# Patient Record
(demographics unavailable — no encounter records)

---

## 2024-11-10 NOTE — PHYSICAL EXAM
[Frail] : frail [Normal Venous Pressure] : normal venous pressure [Normal S1, S2] : normal S1, S2 [Murmur] : murmur [Clear Lung Fields] : clear lung fields [Soft] : abdomen soft [Normal] : no rash, no skin lesions [No Focal Deficits] : no focal deficits [Alert and Oriented] : alert and oriented [de-identified] : hard of hearing

## 2024-11-10 NOTE — DISCUSSION/SUMMARY
[FreeTextEntry1] : Assessment:  1. Medtronic Dual chamber pacemaker.  Unipolar RA/RV leads. V paced 78% 2. Mechanical aortic valve 3. paroxysmal atrial flutter 4. HTN 5. [id number] score is 4.  Maintained on Coumadin.   Mrs. Brown presents to establish care of her device.  Her device functioning normally.  She feels well.  No device adjustments were made.  Her AF burden is 41.8%.  She reports no significant symptoms secondary to arrhythmia and is maintained on Coumadin and Metoprolol XL 25mg QD.  She is currently monitored remotely / [place].  She has signed consent for transfer of her remote monitoring to [facility].   Follow up in 6mo.

## 2024-11-10 NOTE — PHYSICAL EXAM
[Frail] : frail [Normal Venous Pressure] : normal venous pressure [Normal S1, S2] : normal S1, S2 [Murmur] : murmur [Clear Lung Fields] : clear lung fields [Soft] : abdomen soft [Normal] : no rash, no skin lesions [No Focal Deficits] : no focal deficits [Alert and Oriented] : alert and oriented [de-identified] : hard of hearing

## 2024-11-10 NOTE — DISCUSSION/SUMMARY
[FreeTextEntry1] : Assessment:  1. Medtronic Dual chamber pacemaker.  Unipolar RA/RV leads. V paced 78% 2. Mechanical aortic valve 3. paroxysmal atrial flutter 4. HTN 5. [id number] score is 4.  Maintained on Coumadin.   Mrs. Brown presents to establish care of her device.  Her device functioning normally.  She feels well.  No device adjustments were made.  Her AF burden is 41.8%.  She reports no significant symptoms secondary to arrhythmia and is maintained on Coumadin and Metoprolol XL 25mg QD.  She is currently monitored remotely / [place].  She has signed consent for transfer of her remote monitoring to [facility].   Follow up in 6mo.

## 2024-11-10 NOTE — HISTORY OF PRESENT ILLNESS
[FreeTextEntry1] : Mrs. Brown is a 95yo F with a PMHx of HTN, hyperlipidemia, paroxysmal Aflutter, mechanical aortic valve, s/p PPM medtronic dual chamber PPM.  She follows with Dr. Houser and presents today to establish care of her device.  She is accompanied by her daughter.  She is feeling well without any device related complaints.  She is sedentary and requires assistance to stand.   PMH: large scalp mass.  no plan for removal YUNG CKD  Echo 1/16/24: LVEF 45-50%. DARIN 1.5 cm. Moderate RVD and reduced systolic function. Moderate MR. Mechanical AVR. Peak/mean gradient 20/7 mmHg.  DVI 0.56. Moderate TR. PASP 33 mmHg.  Device Interrogation 11/6/24 Medtronic dual chamber PPM.  Unipolar RA and RV leads.  Battery estimating 10.7 years.  DDDR .  MVP off.  AV conduction during device check today.  frequent atrial ectopy. AF burden 41.8% All pacing, sensing and impedance levels within normal limits

## 2024-11-10 NOTE — ADDENDUM
[FreeTextEntry1] :  I, Elizabeth Arango, am scribing for and the presence of Dr. Wright the following sections: HPI, PMH,Family/social history, ROS, Physical Exam, Assessment / Plan.I, Jaylen Wright, personally performed the services described in the documentation, reviewed the documentation recorded by the scribe in my presence and it accurately and completely records my words and actions.

## 2024-11-10 NOTE — HISTORY OF PRESENT ILLNESS
[FreeTextEntry1] : Mrs. Brown is a 93yo F with a PMHx of HTN, hyperlipidemia, paroxysmal Aflutter, mechanical aortic valve, s/p PPM medtronic dual chamber PPM.  She follows with Dr. Houser and presents today to establish care of her device.  She is accompanied by her daughter.  She is feeling well without any device related complaints.  She is sedentary and requires assistance to stand.   PMH: large scalp mass.  no plan for removal YUNG CKD  Echo 1/16/24: LVEF 45-50%. DARIN 1.5 cm. Moderate RVD and reduced systolic function. Moderate MR. Mechanical AVR. Peak/mean gradient 20/7 mmHg.  DVI 0.56. Moderate TR. PASP 33 mmHg.  Device Interrogation 11/6/24 Medtronic dual chamber PPM.  Unipolar RA and RV leads.  Battery estimating 10.7 years.  DDDR .  MVP off.  AV conduction during device check today.  frequent atrial ectopy. AF burden 41.8% All pacing, sensing and impedance levels within normal limits

## 2024-12-30 NOTE — PHYSICAL EXAM
[No Acute Distress] : no acute distress [Well Nourished] : well nourished [Well Developed] : well developed [Well-Appearing] : well-appearing [Normal Sclera/Conjunctiva] : normal sclera/conjunctiva [Normal Outer Ear/Nose] : the outer ears and nose were normal in appearance [No Respiratory Distress] : no respiratory distress  [No Accessory Muscle Use] : no accessory muscle use [No Rash] : no rash [No Focal Deficits] : no focal deficits [Normal Affect] : the affect was normal [Normal Insight/Judgement] : insight and judgment were intact [de-identified] : Well-appearing elderly female

## 2024-12-30 NOTE — PLAN
[FreeTextEntry1] : Family requesting antibiotics for possibility of bacterial infection Explained that most likely this is a viral process and they will attempt to have patient's cardiologist order an RVP when they come to draw her weekly PT/INR because she takes Coumadin Rx Z-Eric-family understands to start antibiotics if patient seems to be worsening or has fever or increase in mucus or productive sputum Instructions and precautions reviewed

## 2024-12-30 NOTE — REVIEW OF SYSTEMS
[Fever] : no fever [Chills] : no chills [Fatigue] : fatigue [Vision Problems] : no vision problems [Postnasal Drip] : postnasal drip [Chest Pain] : no chest pain [Shortness Of Breath] : no shortness of breath [Cough] : cough [Abdominal Pain] : no abdominal pain [Vomiting] : no vomiting [Dysuria] : no dysuria [Skin Rash] : no skin rash [Headache] : no headache [FreeTextEntry4] : Congestion

## 2024-12-30 NOTE — HISTORY OF PRESENT ILLNESS
[Home] : at home, [unfilled] , at the time of the visit. [Other Location: e.g. Home (Enter Location, City,State)___] : at [unfilled] [FreeTextEntry3] : Patient's daughter, Halina [FreeTextEntry8] :  94-year-old female with history of atrial fibrillation, congestive heart failure, status post PPM, CKD, anemia and hypertension with daughter (Halina) complaining of URI symptoms which started last evening with increased cough and congestion with no reported fever.  Patient's daughter states pulse ox's have been between 91 to 93%.  Patient's daughter's  recently had influenza and patient did receive her influenza vaccine.  Patient tolerating p.o. and soups without difficulty no reported chest pain, shortness of breath, abdominal pain, nausea, vomiting or urinary symptoms

## 2025-03-27 NOTE — PHYSICAL EXAM
[No Supraclavicular Adenopathy] : no supraclavicular adenopathy [No Cervical Adenopathy] : no cervical adenopathy [de-identified] : The central portion of the posterior scalp is a 6 cm fungating mass that extends at 2 and half centimeters with a necrotic surface with old hematoma but no purulence or erythema.  The lesion is freely mobile and not fixed to the skull.

## 2025-03-27 NOTE — PAST MEDICAL HISTORY
[Menarche Age ____] : age at menarche was [unfilled] [Total Preg ___] : G[unfilled] [Age At Live Birth ___] : Age at live birth: [unfilled] [History of Hormone Replacement Treatment] : has no history of hormone replacement treatment

## 2025-03-27 NOTE — PHYSICAL EXAM
[No Supraclavicular Adenopathy] : no supraclavicular adenopathy [No Cervical Adenopathy] : no cervical adenopathy [de-identified] : The central portion of the posterior scalp is a 6 cm fungating mass that extends at 2 and half centimeters with a necrotic surface with old hematoma but no purulence or erythema.  The lesion is freely mobile and not fixed to the skull.

## 2025-03-27 NOTE — HISTORY OF PRESENT ILLNESS
[FreeTextEntry1] : 94-year-old postmenopausal woman has a multiple year history of a skin lesion on her scalp that recently started to become necrotic and bleed especially since she is on Coumadin.  She went to Dr. Zaid barrera from general surgery who did a biopsy and this showed well-differentiated keratinizing squamous cell carcinoma.  She comes in for recommendations from me.  Patient's family tell us that she has itching this area and disrupting it causing bleeding but it is never gotten infected.  Patient has no other signs of any other abnormalities.  Family wants to be relatively conservative with this lesion.

## 2025-03-28 NOTE — HISTORY OF PRESENT ILLNESS
[FreeTextEntry1] : Patient is a 94-year-old elderly  female who has a history of a skin lesion growing in her scalp over the last few years.  The family notes that over the last 1 year the lesion started increasing in size more rapidly and became necrotic and has bled repeatedly since she was started on Coumadin.  She was seen in general surgery did biopsy.  The pathology was consistent with a well-differentiated squamous cell carcinoma of the skin.  Patient was referred to Dr. Du in surgical oncology.  Given her age and general condition, she was recommended consideration for radiation therapy.

## 2025-03-28 NOTE — PHYSICAL EXAM
[Thin] : thin [Normal] : oriented to person, place and time, the affect was normal, the mood was normal and not anxious [de-identified] : A 5 x 5 cm exophytic lesion noted arising from scalp near the vertex with a central ulceration.  The lesion is not fixed to the underlying bone.  Has a reasonably well-defined edges. [de-identified] : No preauricular nodes.  No postauricular nodes.  No other neck nodes palpable

## 2025-03-28 NOTE — VITALS
[Maximal Pain Intensity: 0/10] : 0/10 [NoTreatment Scheduled] : no treatment scheduled [60: Requires occasional assistance, but is able to care for most of his/her needs] : 60: Requires occasional assistance, but is able to care for most of his/her needs

## 2025-03-28 NOTE — PHYSICAL EXAM
[Thin] : thin [Normal] : oriented to person, place and time, the affect was normal, the mood was normal and not anxious [de-identified] : A 5 x 5 cm exophytic lesion noted arising from scalp near the vertex with a central ulceration.  The lesion is not fixed to the underlying bone.  Has a reasonably well-defined edges. [de-identified] : No preauricular nodes.  No postauricular nodes.  No other neck nodes palpable

## 2025-04-16 NOTE — PHYSICAL EXAM
[General Appearance - Well Developed] : well developed [Sclera] : the sclera and conjunctiva were normal [] : no respiratory distress [Exaggerated Use Of Accessory Muscles For Inspiration] : no accessory muscle use [Nondistended] : nondistended [Oriented To Time, Place, And Person] : oriented to person, place, and time [de-identified] : tumor posterior scalp; no erythema or desquamation w/in RT field [de-identified] : patient in wheelchair

## 2025-04-16 NOTE — DISEASE MANAGEMENT
[Clinical] : TNM Stage: c [II] : II [TTNM] : 2 [NTNM] : 0 [MTNM] : 0 [de-identified] : [id number] [[place] [id number]] : 8148 [de-identified] : scalp

## 2025-04-16 NOTE — DISEASE MANAGEMENT
[Clinical] : TNM Stage: c [II] : II [TTNM] : 2 [MTNM] : 0 [NTNM] : 0 [de-identified] : 1113 [[place] [id number]] : 6167 [de-identified] : scalp

## 2025-04-16 NOTE — PHYSICAL EXAM
[General Appearance - Well Developed] : well developed [Sclera] : the sclera and conjunctiva were normal [] : no respiratory distress [Exaggerated Use Of Accessory Muscles For Inspiration] : no accessory muscle use [Nondistended] : nondistended [Oriented To Time, Place, And Person] : oriented to person, place, and time [de-identified] : tumor posterior scalp; no erythema or desquamation w/in RT field [de-identified] : patient in wheelchair

## 2025-04-16 NOTE — REVIEW OF SYSTEMS
[Fatigue: Grade 1 - Fatigue relieved by rest] : Fatigue: Grade 1 - Fatigue relieved by rest [Pruritus: Grade 0] : Pruritus: Grade 0 [Dermatitis Radiation: Grade 0] : Dermatitis Radiation: Grade 0

## 2025-04-16 NOTE — HISTORY OF PRESENT ILLNESS
[FreeTextEntry1] : 4/16/2025 Ms. Brown presents today for her OTV.  She completed 6/15 fractions to the scalp for a total of 1800 cGy to the scalp.  skin care with Aquaphor was reinforced with her family.  No bleeding or pain was reported at the treatment site.

## 2025-04-22 NOTE — REVIEW OF SYSTEMS
[Fatigue: Grade 1 - Fatigue relieved by rest] : Fatigue: Grade 1 - Fatigue relieved by rest [Pruritus: Grade 0] : Pruritus: Grade 0 [Skin Induration: Grade 1 - Mild induration, able to move skin parallel to plane (sliding) and perpendicular to skin (pinching up)] : Skin Induration: Grade 1 - Mild induration, able to move skin parallel to plane (sliding) and perpendicular to skin (pinching up)

## 2025-04-22 NOTE — HISTORY OF PRESENT ILLNESS
[FreeTextEntry1] : 4/16/2025 Ms. Brown presents today for her OTV.  She completed 6/15 fractions to the scalp for a total of 1800 cGy to the scalp.  skin care with Aquaphor was reinforced with her family.  No bleeding or pain was reported at the treatment site.    4/22/25 FX 10 Today she reports no pain at the treatment site.  She is using Aquaphor twice per day as directed.  Her appetite has been good and she has been eating well.

## 2025-04-22 NOTE — DISEASE MANAGEMENT
[Clinical] : TNM Stage: c [TTNM] : 2 [NTNM] : 0 [MTNM] : 0 [II] : II [de-identified] : [id number] [[place] [address]] : 7645 [de-identified] : scalp

## 2025-04-29 NOTE — DISEASE MANAGEMENT
[Clinical] : TNM Stage: c [TTNM] : 2 [NTNM] : 0 [MTNM] : 0 [II] : II [de-identified] : 0045 [[place] [address]] : 8439 [de-identified] : scalp

## 2025-04-29 NOTE — HISTORY OF PRESENT ILLNESS
[FreeTextEntry1] : 4/16/2025 Ms. Brown presents today for her OTV.  She completed 6/15 fractions to the scalp for a total of 1800 cGy to the scalp.  skin care with Aquaphor was reinforced with her family.  No bleeding or pain was reported at the treatment site.    4/22/25 FX 10 Today she reports no pain at the treatment site.  She is using Aquaphor twice per day as directed.  Her appetite has been good and she has been eating well.    4/29/2025 Ms. Brown presents today for her OTV.  She completed 15/15 fractions to the scalp for a total of 4500 cGy.

## 2025-05-27 NOTE — DISEASE MANAGEMENT
[Clinical] : TNM Stage: c [II] : II [TTNM] : 2 [NTNM] : 0 [MTNM] : 0 [de-identified] : [id number] [[place] [id number]] : 0050 [de-identified] : scalp, 15 fractions completed on 04/29/25

## 2025-05-27 NOTE — DISEASE MANAGEMENT
[Clinical] : TNM Stage: c [II] : II [TTNM] : 2 [NTNM] : 0 [MTNM] : 0 [de-identified] : 1924 [[place] [address]] : 6885 [de-identified] : scalp, 15 fractions completed on 04/29/25

## 2025-05-27 NOTE — REASON FOR VISIT
[Post-Treatment Evaluation] : post-treatment evaluation for [Other: ___] : [unfilled] [Family Member] : family member [Formal Caregiver] : formal caregiver

## 2025-05-27 NOTE — REVIEW OF SYSTEMS
[Negative] : Allergic/Immunologic [Pruritus: Grade 0] : Pruritus: Grade 0 [Skin Hyperpigmentation: Grade 1 - Hyperpigmentation covering <10% BSA; no psychosocial impact] : Skin Hyperpigmentation: Grade 1 - Hyperpigmentation covering <10% BSA; no psychosocial impact [Dermatitis Radiation: Grade 0] : Dermatitis Radiation: Grade 0 [de-identified] : lesion to scalp

## 2025-05-27 NOTE — REVIEW OF SYSTEMS
[Negative] : Allergic/Immunologic [Pruritus: Grade 0] : Pruritus: Grade 0 [Skin Hyperpigmentation: Grade 1 - Hyperpigmentation covering <10% BSA; no psychosocial impact] : Skin Hyperpigmentation: Grade 1 - Hyperpigmentation covering <10% BSA; no psychosocial impact [Dermatitis Radiation: Grade 0] : Dermatitis Radiation: Grade 0 [de-identified] : lesion to scalp

## 2025-05-27 NOTE — PHYSICAL EXAM
[] : no respiratory distress [Exaggerated Use Of Accessory Muscles For Inspiration] : no accessory muscle use [Heart Rate And Rhythm] : heart rate and rhythm were normal [Arterial Pulses Normal] : the arterial pulses were normal [Abdomen Soft] : soft [Nondistended] : nondistended [Normal] : the sclera and conjunctiva were normal, pupils were equal in size, round, reactive to light and extraocular movements were intact. [de-identified] : lesion to the scalp is healing well, granulation tissue noted, no drainage or odor [de-identified] : see abov

## 2025-05-27 NOTE — PHYSICAL EXAM
[] : no respiratory distress [Exaggerated Use Of Accessory Muscles For Inspiration] : no accessory muscle use [Heart Rate And Rhythm] : heart rate and rhythm were normal [Arterial Pulses Normal] : the arterial pulses were normal [Abdomen Soft] : soft [Nondistended] : nondistended [Normal] : the sclera and conjunctiva were normal, pupils were equal in size, round, reactive to light and extraocular movements were intact. [de-identified] : lesion to the scalp is healing well, granulation tissue noted, no drainage or odor [de-identified] : see abov

## 2025-05-27 NOTE — HISTORY OF PRESENT ILLNESS
[FreeTextEntry1] : Patient is a 95-year-old elderly  female who has a history of a skin lesion growing in her scalp over the last few years.  The family notes that over the last 1 year the lesion started increasing in size more rapidly and became necrotic and has bled repeatedly since she was started on Coumadin.  She was seen in general surgery did biopsy.  The pathology was consistent with a well-differentiated squamous cell carcinoma of the skin.  Patient was referred to Dr. Du in surgical oncology.  Given her age and general condition, she was recommended consideration for radiation therapy.  05/27/25 Ms. Brown presents today for PTE s/p 15 fractions of radiation therapy to the scalp completed on 04/29/25. Lesion to the scalp is healing, no drainage or odor noted. Patient's caregiver and family have been applying Aquaphor to the site BID, she also is wearing a hat outside. [facility] were [facility]

## 2025-07-02 NOTE — HISTORY OF PRESENT ILLNESS
[FreeTextEntry1] : Patient is a 95-year-old elderly  female who has a history of a skin lesion growing in her scalp over the last few years. The family notes that over the last 1 year the lesion started increasing in size more rapidly and became necrotic and has bled repeatedly since she was started on Coumadin. She was seen in general surgery did biopsy. The pathology was consistent with a well-differentiated squamous cell carcinoma of the skin. Patient was referred to Dr. Du in surgical oncology. Given her age and general condition, she was recommended consideration for radiation therapy.  05/27/25 Ms. Brown presents today for PTE s/p 15 fractions of radiation therapy to the scalp completed on 04/29/25. Lesion to the scalp is healing, no drainage or odor noted. Patient's caregiver and family have been applying Aquaphor to the site BID, she also is wearing a hat outside. [facility] were [facility].  7/15/2025 Ms. Brown presents today for a follow up. She is s/p 15 fractions of radiation therapy to the scalp completed on 04/29/25.

## 2025-07-02 NOTE — DISEASE MANAGEMENT
[Clinical] : TNM Stage: c [TTNM] : 2 [NTNM] : 0 [MTNM] : 0 [II] : II [de-identified] : 8459 [[place] [address]] : 3007 [de-identified] : scalp, 15 fractions completed on 04/29/25

## 2025-07-02 NOTE — REASON FOR VISIT
[Routine Follow-Up] : routine follow-up visit for [Other: ___] : [unfilled] [Family Member] : family member [Formal Caregiver] : formal caregiver

## 2025-07-15 NOTE — HISTORY OF PRESENT ILLNESS
[FreeTextEntry1] : Patient is a 95-year-old elderly  female who has a history of a skin lesion growing in her scalp over the last few years. The family notes that over the last 1 year the lesion started increasing in size more rapidly and became necrotic and has bled repeatedly since she was started on Coumadin. She was seen in general surgery did biopsy. The pathology was consistent with a well-differentiated squamous cell carcinoma of the skin. Patient was referred to Dr. Du in surgical oncology. Given her age and general condition, she was recommended consideration for radiation therapy.  05/27/25 Ms. Brown presents today for PTE s/p 15 fractions of radiation therapy to the scalp completed on 04/29/25. Lesion to the scalp is healing, no drainage or odor noted. Patient's caregiver and family have been applying Aquaphor to the site BID, she also is wearing a hat outside. They were [facility].  7/15/2025 Ms. Brown presents today for a follow up. She is s/p 15 fractions of radiation therapy to the scalp completed on 04/29/25. Her wound scalp is healing well, no drainage or odor noted. She has been applying Aquaphor regularly and is wearing a hat when outdoors.

## 2025-07-15 NOTE — DISEASE MANAGEMENT
[TTNM] : 2 [NTNM] : 0 [MTNM] : 0 [de-identified] : 0123 [[place] [address]] : 9479 [de-identified] : scalp, 15 fractions completed on 04/29/25

## 2025-07-15 NOTE — PHYSICAL EXAM
[] : no respiratory distress [Exaggerated Use Of Accessory Muscles For Inspiration] : no accessory muscle use 2 [Heart Rate And Rhythm] : heart rate and rhythm were normal [Arterial Pulses Normal] : the arterial pulses were normal [Abdomen Soft] : soft [Nondistended] : nondistended [Normal] : oriented to person, place and time, the affect was normal, the mood was normal and not anxious [de-identified] : lesion to the scalp is healing well, granulation tissue noted, no drainage or odor [de-identified] : see above, hair is also growing slowly

## 2025-07-15 NOTE — PHYSICAL EXAM
[] : no respiratory distress [Exaggerated Use Of Accessory Muscles For Inspiration] : no accessory muscle use [Heart Rate And Rhythm] : heart rate and rhythm were normal [Arterial Pulses Normal] : the arterial pulses were normal [Abdomen Soft] : soft [Nondistended] : nondistended [Normal] : oriented to person, place and time, the affect was normal, the mood was normal and not anxious [de-identified] : lesion to the scalp is healing well, granulation tissue noted, no drainage or odor [de-identified] : see above, hair is also growing slowly

## 2025-07-15 NOTE — REVIEW OF SYSTEMS
[Gait Disturbance] : gait disturbance [Negative] : Allergic/Immunologic [Pruritus: Grade 0] : Pruritus: Grade 0 [Skin Hyperpigmentation: Grade 0] : Skin Hyperpigmentation: Grade 0 [Skin Induration: Grade 1 - Mild induration, able to move skin parallel to plane (sliding) and perpendicular to skin (pinching up)] : Skin Induration: Grade 1 - Mild induration, able to move skin parallel to plane (sliding) and perpendicular to skin (pinching up) [Dermatitis Radiation: Grade 0] : Dermatitis Radiation: Grade 0 [Fatigue] : no fatigue [de-identified] : scalp lesion

## 2025-07-15 NOTE — PHYSICAL EXAM
[] : no respiratory distress [Exaggerated Use Of Accessory Muscles For Inspiration] : no accessory muscle use [Heart Rate And Rhythm] : heart rate and rhythm were normal [Arterial Pulses Normal] : the arterial pulses were normal [Abdomen Soft] : soft [Nondistended] : nondistended [Normal] : oriented to person, place and time, the affect was normal, the mood was normal and not anxious [de-identified] : lesion to the scalp is healing well, granulation tissue noted, no drainage or odor [de-identified] : see above, hair is also growing slowly

## 2025-07-15 NOTE — REVIEW OF SYSTEMS
[Gait Disturbance] : gait disturbance [Negative] : Allergic/Immunologic [Pruritus: Grade 0] : Pruritus: Grade 0 [Skin Hyperpigmentation: Grade 0] : Skin Hyperpigmentation: Grade 0 [Skin Induration: Grade 1 - Mild induration, able to move skin parallel to plane (sliding) and perpendicular to skin (pinching up)] : Skin Induration: Grade 1 - Mild induration, able to move skin parallel to plane (sliding) and perpendicular to skin (pinching up) [Dermatitis Radiation: Grade 0] : Dermatitis Radiation: Grade 0 [Fatigue] : no fatigue [de-identified] : scalp lesion

## 2025-07-15 NOTE — REVIEW OF SYSTEMS
[Gait Disturbance] : gait disturbance [Negative] : Allergic/Immunologic [Pruritus: Grade 0] : Pruritus: Grade 0 [Skin Hyperpigmentation: Grade 0] : Skin Hyperpigmentation: Grade 0 [Skin Induration: Grade 1 - Mild induration, able to move skin parallel to plane (sliding) and perpendicular to skin (pinching up)] : Skin Induration: Grade 1 - Mild induration, able to move skin parallel to plane (sliding) and perpendicular to skin (pinching up) [Dermatitis Radiation: Grade 0] : Dermatitis Radiation: Grade 0 [Fatigue] : no fatigue [de-identified] : scalp lesion

## 2025-07-15 NOTE — DISEASE MANAGEMENT
[TTNM] : 2 [NTNM] : 0 [MTNM] : 0 [de-identified] : [id number] [[place] [address]] : 7292 [de-identified] : scalp, 15 fractions completed on 04/29/25

## 2025-07-15 NOTE — DISEASE MANAGEMENT
[TTNM] : 2 [NTNM] : 0 [MTNM] : 0 [de-identified] : 4559 [[place] [address]] : 1100 [de-identified] : scalp, 15 fractions completed on 04/29/25